# Patient Record
Sex: FEMALE | Race: WHITE | Employment: FULL TIME | URBAN - METROPOLITAN AREA
[De-identification: names, ages, dates, MRNs, and addresses within clinical notes are randomized per-mention and may not be internally consistent; named-entity substitution may affect disease eponyms.]

---

## 2017-01-16 ENCOUNTER — TELEPHONE (OUTPATIENT)
Dept: DERMATOLOGY CLINIC | Facility: CLINIC | Age: 52
End: 2017-01-16

## 2017-01-16 NOTE — TELEPHONE ENCOUNTER
LOV 11/11/16. Per your note, patient to see plastic surgeon. What doctor should I give to patient? Please advise.  Thank you

## 2017-01-18 NOTE — TELEPHONE ENCOUNTER
Pt chooses to go with Dr. Elvis Echeverria - since there's no pathology to send, should I send any note?  Kindly advise

## 2017-04-17 ENCOUNTER — TELEPHONE (OUTPATIENT)
Dept: OBGYN CLINIC | Facility: CLINIC | Age: 52
End: 2017-04-17

## 2017-04-17 NOTE — TELEPHONE ENCOUNTER
Lm requested rx sent to local pharmacy versus mail order. Pt was seen for annual with [facility] on 3/7/16. Pt's last pap/HPV negative on 1/27/14. Per [facility]'s visit notes next cotest 1/19.  Pt's last mammo on 3/14/16 was normal.  Pt has next annual schedule on 5/3/

## 2017-05-03 ENCOUNTER — OFFICE VISIT (OUTPATIENT)
Dept: OBGYN CLINIC | Facility: CLINIC | Age: 52
End: 2017-05-03

## 2017-05-03 VITALS
SYSTOLIC BLOOD PRESSURE: 116 MMHG | DIASTOLIC BLOOD PRESSURE: 74 MMHG | WEIGHT: 198 LBS | HEIGHT: 72 IN | BODY MASS INDEX: 26.82 KG/M2 | HEART RATE: 67 BPM

## 2017-05-03 DIAGNOSIS — Z12.31 VISIT FOR SCREENING MAMMOGRAM: ICD-10-CM

## 2017-05-03 DIAGNOSIS — Z30.41 ORAL CONTRACEPTIVE PILL SURVEILLANCE: ICD-10-CM

## 2017-05-03 DIAGNOSIS — Z01.419 ENCOUNTER FOR GYNECOLOGICAL EXAMINATION WITHOUT ABNORMAL FINDING: Primary | ICD-10-CM

## 2017-05-03 PROCEDURE — 99396 PREV VISIT EST AGE 40-64: CPT | Performed by: OBSTETRICS & GYNECOLOGY

## 2017-05-03 NOTE — PROGRESS NOTES
Jeremy Reyes is a 46year old female  Patient's last menstrual period was 2017.  Patient presents with:  Gyn Exam: ANNUAL EXAM / BCP REFILL -- had questions re: brother-in-law dx w/ throat / tongue cancer caused by HPV; no hot flashes during p coronary artery disease   • Melanoma Father    • Neurological Disorder Sister      multiple sclerosis   • Thyroid disease Brother    • Cancer Maternal Grandfather      pancreatic   • Diabetes Maternal Uncle    • Heart Disease Paternal Grandmother      CAD normal without palpable masses, tenderness, asymmetry, nipple discharge, nipple retraction or skin changes  (+) breast implants  Abdomen:  soft, nontender, nondistended, no masses  (+) tummy tuck scar  Skin/Hair: no unusual rashes or bruises  Extremities:

## 2017-06-02 ENCOUNTER — HOSPITAL ENCOUNTER (OUTPATIENT)
Dept: MAMMOGRAPHY | Age: 52
Discharge: HOME | End: 2017-06-02
Attending: OBSTETRICS & GYNECOLOGY
Payer: COMMERCIAL

## 2017-06-02 DIAGNOSIS — Z12.31 VISIT FOR SCREENING MAMMOGRAM: ICD-10-CM

## 2017-06-02 PROCEDURE — 77067 SCR MAMMO BI INCL CAD: CPT | Performed by: OBSTETRICS & GYNECOLOGY

## 2017-07-05 ENCOUNTER — OFFICE VISIT (OUTPATIENT)
Dept: DERMATOLOGY CLINIC | Facility: CLINIC | Age: 52
End: 2017-07-05

## 2017-07-05 ENCOUNTER — TELEPHONE (OUTPATIENT)
Dept: DERMATOLOGY CLINIC | Facility: CLINIC | Age: 52
End: 2017-07-05

## 2017-07-05 DIAGNOSIS — D23.9 BENIGN NEOPLASM OF SKIN, UNSPECIFIED LOCATION: ICD-10-CM

## 2017-07-05 DIAGNOSIS — D22.9 MULTIPLE NEVI: ICD-10-CM

## 2017-07-05 DIAGNOSIS — B07.8 OTHER VIRAL WARTS: Primary | ICD-10-CM

## 2017-07-05 DIAGNOSIS — L81.4 LENTIGO: ICD-10-CM

## 2017-07-05 PROCEDURE — 99212 OFFICE O/P EST SF 10 MIN: CPT | Performed by: DERMATOLOGY

## 2017-07-05 PROCEDURE — 99213 OFFICE O/P EST LOW 20 MIN: CPT | Performed by: DERMATOLOGY

## 2017-07-05 PROCEDURE — 17110 DESTRUCTION B9 LES UP TO 14: CPT | Performed by: DERMATOLOGY

## 2017-07-05 RX ORDER — IMIQUIMOD 12.5 MG/.25G
CREAM TOPICAL
Qty: 24 PACKET | Refills: 6 | Status: SHIPPED | OUTPATIENT
Start: 2017-07-05 | End: 2018-05-10

## 2017-07-19 ENCOUNTER — TELEPHONE (OUTPATIENT)
Dept: DERMATOLOGY CLINIC | Facility: CLINIC | Age: 52
End: 2017-07-19

## 2017-07-21 NOTE — TELEPHONE ENCOUNTER
PA response from [employer] states that Imiquimod PA was not approved, states med will be approved for up to 12 packets per month for up to 4 months.  Please advise

## 2017-07-22 NOTE — TELEPHONE ENCOUNTER
[facility] pt picked up earlier in July 12 packets of imiquimod and was covered.

## 2017-07-23 NOTE — PROGRESS NOTES
Petra Murry is a 46year old female. HPI:     CC:  Patient presents with:  Warts: Pt c/o warts, one at L thumb prev tx with cryo. Pt thinks she has others at hands. \"I can't see them b/c I forgot my reading classes. \"          Allergies:  Erythromyci Unknown  Sulfa Antibiotics       Unknown    Past Medical History:   Diagnosis Date   • Bulimia 1982    effexor     Past Surgical History:  7/2003: ABDOMINOPLASTY  12/2003: BREAST SURGERY      Comment: Revision of breast implants  7/2003: BREAST of concern at this time. Patient presents with concerns above. Patient has been in their usual state of health. History, medications, allergies reviewed as noted. ROS:  Denies any other systemic complaints.   No new or changeing lesions other than grid.  Viral pathophysiology, postoperative care, possibility of recurrence, spread reviewed.   Extensive periungual lesions at [facility] consider topical retinoid even over-the-counter, repeat cryo 3-4 weeks    Family history skin cancer no new suspicious les

## 2018-02-02 ENCOUNTER — TELEPHONE (OUTPATIENT)
Dept: OBGYN CLINIC | Facility: CLINIC | Age: 53
End: 2018-02-02

## 2018-02-02 NOTE — TELEPHONE ENCOUNTER
Pt states she has been seen in urgent care 3x in the past month for UTI's and requesting to be seen. Informed pt nurse will forward a message to Masha Ricci to review and advise is pt needs to be seen.  Pt states she is also having menopausal symptoms and would lik

## 2018-05-10 ENCOUNTER — OFFICE VISIT (OUTPATIENT)
Dept: OBGYN CLINIC | Facility: CLINIC | Age: 53
End: 2018-05-10

## 2018-05-10 VITALS
HEIGHT: 72 IN | SYSTOLIC BLOOD PRESSURE: 109 MMHG | WEIGHT: 193 LBS | HEART RATE: 64 BPM | DIASTOLIC BLOOD PRESSURE: 73 MMHG | BODY MASS INDEX: 26.14 KG/M2

## 2018-05-10 DIAGNOSIS — Z01.419 ENCOUNTER FOR GYNECOLOGICAL EXAMINATION WITHOUT ABNORMAL FINDING: Primary | ICD-10-CM

## 2018-05-10 DIAGNOSIS — Z12.31 VISIT FOR SCREENING MAMMOGRAM: ICD-10-CM

## 2018-05-10 DIAGNOSIS — Z30.41 ORAL CONTRACEPTIVE PILL SURVEILLANCE: ICD-10-CM

## 2018-05-10 PROCEDURE — 99396 PREV VISIT EST AGE 40-64: CPT | Performed by: OBSTETRICS & GYNECOLOGY

## 2018-05-10 NOTE — PROGRESS NOTES
Jeremy Reyes is a 46year old female  Patient's last menstrual period was 2018. Patient presents with:  Gyn Exam: ANNUAL EXAM/ BCP REFILL -- no issues. No hot flashes during placebo week.   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

## 2018-07-01 ENCOUNTER — HOSPITAL ENCOUNTER (OUTPATIENT)
Dept: MAMMOGRAPHY | Facility: HOSPITAL | Age: 53
Discharge: HOME | End: 2018-07-01
Attending: OBSTETRICS & GYNECOLOGY
Payer: COMMERCIAL

## 2018-07-01 DIAGNOSIS — Z12.31 VISIT FOR SCREENING MAMMOGRAM: ICD-10-CM

## 2018-07-01 PROCEDURE — 77067 SCR MAMMO BI INCL CAD: CPT | Performed by: OBSTETRICS & GYNECOLOGY

## 2018-07-01 PROCEDURE — 77063 BREAST TOMOSYNTHESIS BI: CPT | Performed by: OBSTETRICS & GYNECOLOGY

## 2018-09-26 ENCOUNTER — TELEPHONE (OUTPATIENT)
Dept: OBGYN CLINIC | Facility: CLINIC | Age: 53
End: 2018-09-26

## 2018-09-26 NOTE — TELEPHONE ENCOUNTER
Pharmacy states pt requested we switch her OCP rx to mail order. I approved the switch and LM for the pt to call us back to be sure she wanted her rx changed to mail order.

## 2018-09-26 NOTE — TELEPHONE ENCOUNTER
Per pharmacy pt is requesting a refill on Norethindrone-Eth Estradiol (Gopal Johnson 1/35, 28,) 1-35 MG-MCG Oral Tab.  Please advise

## 2019-02-27 ENCOUNTER — TELEPHONE (OUTPATIENT)
Dept: OBGYN CLINIC | Facility: CLINIC | Age: 54
End: 2019-02-27

## 2019-02-27 NOTE — TELEPHONE ENCOUNTER
Pt's last annual was 5/10/18, last pap in 2014 was normal, last mammo was normal in 2018. Pt was given 3 months with 3 refills at appt in 5/2018. Pt will need a few more refills to cover until appt due in May.   3 months sent to mail order to cover until

## 2019-05-31 ENCOUNTER — OFFICE VISIT (OUTPATIENT)
Dept: OBGYN CLINIC | Facility: CLINIC | Age: 54
End: 2019-05-31
Payer: COMMERCIAL

## 2019-05-31 VITALS
BODY MASS INDEX: 26 KG/M2 | WEIGHT: 209 LBS | HEART RATE: 61 BPM | SYSTOLIC BLOOD PRESSURE: 126 MMHG | DIASTOLIC BLOOD PRESSURE: 81 MMHG

## 2019-05-31 DIAGNOSIS — Z12.31 VISIT FOR SCREENING MAMMOGRAM: ICD-10-CM

## 2019-05-31 DIAGNOSIS — Z01.419 ENCOUNTER FOR GYNECOLOGICAL EXAMINATION WITHOUT ABNORMAL FINDING: Primary | ICD-10-CM

## 2019-05-31 DIAGNOSIS — Z30.41 ORAL CONTRACEPTIVE PILL SURVEILLANCE: ICD-10-CM

## 2019-05-31 DIAGNOSIS — Z11.3 SCREEN FOR STD (SEXUALLY TRANSMITTED DISEASE): ICD-10-CM

## 2019-05-31 DIAGNOSIS — Z12.4 SCREENING FOR MALIGNANT NEOPLASM OF THE CERVIX: ICD-10-CM

## 2019-05-31 PROCEDURE — 99396 PREV VISIT EST AGE 40-64: CPT | Performed by: OBSTETRICS & GYNECOLOGY

## 2019-06-02 NOTE — PROGRESS NOTES
Richard Vázquez is a 48year old female  Patient's last menstrual period was 05/15/2019. Patient presents with:  Gyn Exam: ANNUAL -- going to [employer] soon  Refill Request: OCP -- turning 54 in few months. Concerned re: fertility chances  . Lifestyle      Physical activity:        Days per week: Not on file        Minutes per session: Not on file      Stress: Not on file    Relationships      Social connections:        Talks on phone: Not on file        Gets together: Not on file        Atten Paternal Uncle         CAD   • Stroke Other         CVA, great aunt   • Lipids Mother         hyperlipidemia   • Gastro-Intestinal Disorder Mother         colon polyps   • Basal Cell Mother        MEDICATIONS:    Current Outpatient Medications:   •  Connie Hernandez bruises  Extremities:  no edema, no cyanosis  Psychiatric:   oriented to time, place, person and situation.  Appropriate mood and affect    Pelvic Exam:  External Genitalia:  normal appearance, hair distribution, and no lesions  Urethral Meatus:   normal in Estradiol (NECON 1/35, 28,) 1-35 MG-MCG Oral Tab; Take 1 tablet by mouth daily. Pap w/ HPV & GC. Chl.Trich done. Declines blood STD screen. Mammogram w/ implants ordered. Wishes to continue ocps x 3 months & then will stop & use condoms.  If no sponta

## 2019-07-02 ENCOUNTER — HOSPITAL ENCOUNTER (OUTPATIENT)
Dept: MAMMOGRAPHY | Age: 54
Discharge: HOME | End: 2019-07-02
Attending: OBSTETRICS & GYNECOLOGY
Payer: COMMERCIAL

## 2019-07-02 DIAGNOSIS — Z12.31 VISIT FOR SCREENING MAMMOGRAM: ICD-10-CM

## 2019-07-02 PROCEDURE — 77063 BREAST TOMOSYNTHESIS BI: CPT | Performed by: OBSTETRICS & GYNECOLOGY

## 2019-07-02 PROCEDURE — 77067 SCR MAMMO BI INCL CAD: CPT | Performed by: OBSTETRICS & GYNECOLOGY

## 2020-08-04 ENCOUNTER — OFFICE VISIT (OUTPATIENT)
Dept: OBGYN CLINIC | Facility: CLINIC | Age: 55
End: 2020-08-04
Payer: COMMERCIAL

## 2020-08-04 VITALS
DIASTOLIC BLOOD PRESSURE: 69 MMHG | HEART RATE: 55 BPM | WEIGHT: 184 LBS | BODY MASS INDEX: 23 KG/M2 | SYSTOLIC BLOOD PRESSURE: 108 MMHG

## 2020-08-04 DIAGNOSIS — Z12.31 VISIT FOR SCREENING MAMMOGRAM: ICD-10-CM

## 2020-08-04 DIAGNOSIS — Z01.419 ENCOUNTER FOR GYNECOLOGICAL EXAMINATION WITHOUT ABNORMAL FINDING: Primary | ICD-10-CM

## 2020-08-04 PROCEDURE — 3078F DIAST BP <80 MM HG: CPT | Performed by: OBSTETRICS & GYNECOLOGY

## 2020-08-04 PROCEDURE — 3074F SYST BP LT 130 MM HG: CPT | Performed by: OBSTETRICS & GYNECOLOGY

## 2020-08-04 PROCEDURE — 99396 PREV VISIT EST AGE 40-64: CPT | Performed by: OBSTETRICS & GYNECOLOGY

## 2020-08-04 RX ORDER — MULTIVITAMIN
1 CAPSULE ORAL DAILY
COMMUNITY

## 2020-08-04 RX ORDER — METHYLPREDNISOLONE 4 MG/1
TABLET ORAL
COMMUNITY
End: 2020-09-17

## 2020-08-04 RX ORDER — MEFLOQUINE HYDROCHLORIDE 250 MG/1
TABLET ORAL
COMMUNITY
End: 2020-09-17

## 2020-08-04 RX ORDER — CLINDAMYCIN HYDROCHLORIDE 150 MG/1
CAPSULE ORAL
COMMUNITY
End: 2020-09-17

## 2020-08-04 RX ORDER — CIPROFLOXACIN 250 MG/1
TABLET, FILM COATED ORAL
COMMUNITY
End: 2020-09-17

## 2020-08-04 RX ORDER — TRAMADOL HYDROCHLORIDE 50 MG/1
TABLET ORAL
COMMUNITY
End: 2020-09-17

## 2020-08-04 RX ORDER — DIAZEPAM 5 MG/1
TABLET ORAL
COMMUNITY
End: 2020-09-17

## 2020-08-04 RX ORDER — DOXYCYCLINE HYCLATE 100 MG
TABLET ORAL
COMMUNITY
End: 2020-09-17

## 2020-08-04 RX ORDER — MELOXICAM 15 MG/1
15 TABLET ORAL DAILY
COMMUNITY
Start: 2020-07-23 | End: 2020-09-17

## 2020-08-04 RX ORDER — FLUTICASONE PROPIONATE 50 MCG
SPRAY, SUSPENSION (ML) NASAL
COMMUNITY
End: 2020-09-17

## 2020-08-04 RX ORDER — FLUOXETINE HYDROCHLORIDE 20 MG/1
CAPSULE ORAL
COMMUNITY
End: 2021-02-01

## 2020-08-04 RX ORDER — FLUOXETINE HYDROCHLORIDE 60 MG/1
1 TABLET, FILM COATED ORAL; ORAL DAILY
COMMUNITY
Start: 2020-06-22

## 2020-08-04 RX ORDER — ALPRAZOLAM 0.25 MG/1
TABLET ORAL
COMMUNITY
End: 2020-09-17

## 2020-08-04 RX ORDER — HYDROCODONE BITARTRATE AND ACETAMINOPHEN 7.5; 325 MG/1; MG/1
TABLET ORAL
COMMUNITY
End: 2020-09-17

## 2020-08-04 RX ORDER — MISOPROSTOL 200 UG/1
TABLET ORAL
Qty: 6 TABLET | Refills: 0 | Status: SHIPPED | OUTPATIENT
Start: 2020-08-04 | End: 2020-09-17

## 2020-08-04 RX ORDER — ACETAMINOPHEN AND CODEINE PHOSPHATE 300; 30 MG/1; MG/1
TABLET ORAL
COMMUNITY
End: 2020-09-17

## 2020-08-04 RX ORDER — GABAPENTIN 300 MG/1
300 CAPSULE ORAL NIGHTLY
COMMUNITY
Start: 2020-07-27 | End: 2021-02-01

## 2020-08-04 RX ORDER — VALACYCLOVIR HYDROCHLORIDE 500 MG/1
TABLET, FILM COATED ORAL
COMMUNITY
End: 2020-09-17

## 2020-08-04 NOTE — PROGRESS NOTES
Petra Murry is a 54year old female  Patient's last menstrual period was 2020. Patient presents with:  Gyn Exam: annual -- stopped ocps x one year. Periods monthly . Dx w/ peripheral neuropathy by [facility] last year.   .All relatives w/ hysterect use: No      Sexual activity: Yes        Birth control/protection: OCP    Lifestyle      Physical activity:        Days per week: Not on file        Minutes per session: Not on file      Stress: Not on file    Relationships      Social connections: • Heart Disease Paternal Grandmother         CAD   • Heart Disease Paternal Uncle         CAD   • Stroke Other         CVA, great aunt   • Lipids Mother         hyperlipidemia   • Gastro-Intestinal Disorder Mother         colon polyps   • Basal Cell Moth (EFFEXOR XR OR), Effexor XR, Disp: , Rfl:   •  Docusate Sodium (DOC-Q-LACE OR), Doc-Q-Lace 100 mg capsule, Disp: , Rfl:   •  FLUoxetine HCl 20 MG Oral Cap, fluoxetine 20 mg capsule  TK 3 CS PO ONCE A DAY IN THE MORNING, Disp: , Rfl:   •  Norethindrone-Eth bruises  Extremities:  no edema, no cyanosis  Psychiatric:   oriented to time, place, person and situation.  Appropriate mood and affect    Pelvic Exam:  External Genitalia:  normal appearance, hair distribution, and no lesions  Urethral Meatus:   normal in

## 2020-09-09 ENCOUNTER — HOSPITAL ENCOUNTER (OUTPATIENT)
Dept: MAMMOGRAPHY | Age: 55
Discharge: HOME | End: 2020-09-09
Attending: OBSTETRICS & GYNECOLOGY
Payer: COMMERCIAL

## 2020-09-09 DIAGNOSIS — Z12.31 VISIT FOR SCREENING MAMMOGRAM: ICD-10-CM

## 2020-09-09 PROCEDURE — 77063 BREAST TOMOSYNTHESIS BI: CPT | Performed by: OBSTETRICS & GYNECOLOGY

## 2020-09-09 PROCEDURE — 77067 SCR MAMMO BI INCL CAD: CPT | Performed by: OBSTETRICS & GYNECOLOGY

## 2020-09-14 ENCOUNTER — TELEPHONE (OUTPATIENT)
Dept: OBGYN CLINIC | Facility: CLINIC | Age: 55
End: 2020-09-14

## 2020-09-14 NOTE — TELEPHONE ENCOUNTER
Spoke with pt to see if she is available later this week. 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  No available appts open after 2:30pm this week. Pt will keep appt for next Wednesday.

## 2020-09-14 NOTE — TELEPHONE ENCOUNTER
Per [facility], pt should be rescheduled for the end of this week. Also block appt time once we cancel appt for today.   LMTCB

## 2020-09-14 NOTE — TELEPHONE ENCOUNTER
Pt states it is more than spotting, like a light flow. Pt states she is on day 6 and her periods usually last 7 days. I asked if pt needs a pad or if she can just use a pantyliner. She states she is using a light tampon.   Pt informed we will have to francia

## 2020-09-17 ENCOUNTER — OFFICE VISIT (OUTPATIENT)
Dept: OBGYN CLINIC | Facility: CLINIC | Age: 55
End: 2020-09-17
Payer: COMMERCIAL

## 2020-09-17 VITALS
DIASTOLIC BLOOD PRESSURE: 62 MMHG | HEART RATE: 50 BPM | SYSTOLIC BLOOD PRESSURE: 97 MMHG | BODY MASS INDEX: 22 KG/M2 | WEIGHT: 179.19 LBS

## 2020-09-17 DIAGNOSIS — N93.9 ABNORMAL UTERINE BLEEDING (AUB): Primary | ICD-10-CM

## 2020-09-17 PROCEDURE — 3078F DIAST BP <80 MM HG: CPT | Performed by: OBSTETRICS & GYNECOLOGY

## 2020-09-17 PROCEDURE — 3074F SYST BP LT 130 MM HG: CPT | Performed by: OBSTETRICS & GYNECOLOGY

## 2020-09-17 PROCEDURE — 58100 BIOPSY OF UTERUS LINING: CPT | Performed by: OBSTETRICS & GYNECOLOGY

## 2020-09-18 NOTE — PROCEDURES
Endometrial Biopsy     Pre-Procedure Care:   Consent was obtained. Procedure/risks were explained. Questions were answered. Correct patient was identified. Correct side and site were confirmed.     Birth control method(s) used: vasectomy    Indication:

## 2021-02-01 ENCOUNTER — OFFICE VISIT (OUTPATIENT)
Dept: DERMATOLOGY CLINIC | Facility: CLINIC | Age: 56
End: 2021-02-01
Payer: COMMERCIAL

## 2021-02-01 DIAGNOSIS — L30.9 DERMATITIS: Primary | ICD-10-CM

## 2021-02-01 DIAGNOSIS — D22.9 MULTIPLE NEVI: ICD-10-CM

## 2021-02-01 DIAGNOSIS — D23.5 BENIGN NEOPLASM OF SKIN OF TRUNK, EXCEPT SCROTUM: ICD-10-CM

## 2021-02-01 DIAGNOSIS — D23.30 BENIGN NEOPLASM OF SKIN OF FACE: ICD-10-CM

## 2021-02-01 DIAGNOSIS — D23.4 BENIGN NEOPLASM OF SCALP AND SKIN OF NECK: ICD-10-CM

## 2021-02-01 DIAGNOSIS — L72.0 EPIDERMAL CYST: ICD-10-CM

## 2021-02-01 DIAGNOSIS — D23.60 BENIGN NEOPLASM OF SKIN OF UPPER LIMB, INCLUDING SHOULDER, UNSPECIFIED LATERALITY: ICD-10-CM

## 2021-02-01 PROCEDURE — 99203 OFFICE O/P NEW LOW 30 MIN: CPT | Performed by: DERMATOLOGY

## 2021-02-01 RX ORDER — CLINDAMYCIN HYDROCHLORIDE 150 MG/1
CAPSULE ORAL
COMMUNITY
Start: 2021-01-23 | End: 2021-09-17

## 2021-02-01 RX ORDER — BETAMETHASONE DIPROPIONATE 0.5 MG/G
CREAM TOPICAL
Qty: 30 G | Refills: 3 | Status: SHIPPED | OUTPATIENT
Start: 2021-02-01 | End: 2021-09-17

## 2021-02-02 ENCOUNTER — IMMUNIZATION (OUTPATIENT)
Dept: LAB | Facility: HOSPITAL | Age: 56
End: 2021-02-02
Attending: EMERGENCY MEDICINE
Payer: COMMERCIAL

## 2021-02-02 DIAGNOSIS — Z23 NEED FOR VACCINATION: Primary | ICD-10-CM

## 2021-02-02 PROCEDURE — 0011A SARSCOV2 VAC 100MCG/0.5ML IM: CPT

## 2021-02-08 NOTE — PROGRESS NOTES
Mary Rodriguez is a 54year old female. HPI:     CC:  Patient presents with:  Upper Body Exam: LOV in 2017. Pt requesting upper body exam. Concerned with with pruritic lesion on right index finger. Mother and father have hx of skin ca.  Denies personal hx o External Cream Apply to affected areas as needed bid 30 g 3   • Multiple Vitamin (MULTIVITAMINS) Oral Cap Take 1 capsule by mouth daily. • FLUoxetine HCl 60 MG Oral Tab Take 1 tablet by mouth daily.      • RESTASIS 0.05 % Ophthalmic Emulsion   3     All Emotionally abused: Not on file        Physically abused: Not on file        Forced sexual activity: Not on file    Other Topics      Concerns:         Service: Not Asked        Blood Transfusions: Not Asked        Caffeine Concern:  Yes reviewed. Updated and new information noted in current visit. Patient with family history of skin cancer. Concern with multiple skin lesions in particular itchy eruption right index finger. Has been going on for a while. Prior biopsies benign.   Pa of upper limb, including shoulder, unspecified laterality  Epidermal cyst    See details on map. Remarkable for:    Eczematous patches over right index finger. More dyshidrotic  Betamethasone  Dermatitis. Meds in grid.   Skin care instructions review

## 2021-03-02 ENCOUNTER — IMMUNIZATION (OUTPATIENT)
Dept: LAB | Facility: HOSPITAL | Age: 56
End: 2021-03-02
Attending: EMERGENCY MEDICINE
Payer: COMMERCIAL

## 2021-03-02 DIAGNOSIS — Z23 NEED FOR VACCINATION: Primary | ICD-10-CM

## 2021-03-02 PROCEDURE — 0012A SARSCOV2 VAC 100MCG/0.5ML IM: CPT

## 2021-09-17 ENCOUNTER — OFFICE VISIT (OUTPATIENT)
Dept: DERMATOLOGY CLINIC | Facility: CLINIC | Age: 56
End: 2021-09-17
Payer: COMMERCIAL

## 2021-09-17 DIAGNOSIS — L40.9 PSORIASIS: Primary | ICD-10-CM

## 2021-09-17 PROCEDURE — 99203 OFFICE O/P NEW LOW 30 MIN: CPT | Performed by: PHYSICIAN ASSISTANT

## 2021-09-17 RX ORDER — BETAMETHASONE DIPROPIONATE 0.5 MG/G
CREAM TOPICAL
Qty: 30 G | Refills: 3 | Status: SHIPPED | OUTPATIENT
Start: 2021-09-17

## 2021-09-17 NOTE — PROGRESS NOTES
HPI:    Patient ID: Tircia Mayberry is a 64year old female. Patient presents with dry and itchy scalp. She uses bethamethasone foam for the psoriasis that she has. Has been more itchy and flaking. No draining noted. No tenderness noted.  No allergies to m This Visit:  Requested Prescriptions     Signed Prescriptions Disp Refills   • Betamethasone Dipropionate Aug 0.05 % External Cream 30 g 3     Sig: Apply to affected areas as needed bid       Imaging & Referrals:  None         #[id number]

## 2022-08-03 ENCOUNTER — TELEPHONE (OUTPATIENT)
Dept: OBGYN CLINIC | Facility: CLINIC | Age: 57
End: 2022-08-03

## 2022-08-03 NOTE — TELEPHONE ENCOUNTER
[facility] needs to cancel her office hours at 4 & 420pm on 10/6. Pt is scheduled for appt on 10/6 at [facility] to see if pt can come in at 1pm on 10/6 instead?

## 2022-10-08 ENCOUNTER — HOSPITAL ENCOUNTER (OUTPATIENT)
Dept: MAMMOGRAPHY | Age: 57
Discharge: HOME | End: 2022-10-08
Attending: FAMILY MEDICINE
Payer: COMMERCIAL

## 2022-10-08 ENCOUNTER — HOSPITAL ENCOUNTER (OUTPATIENT)
Dept: BONE DENSITY | Age: 57
Discharge: HOME | End: 2022-10-08
Attending: FAMILY MEDICINE
Payer: COMMERCIAL

## 2022-10-08 DIAGNOSIS — Z78.0 POST-MENOPAUSAL: ICD-10-CM

## 2022-10-08 DIAGNOSIS — Z12.31 ENCOUNTER FOR SCREENING MAMMOGRAM FOR MALIGNANT NEOPLASM OF BREAST: ICD-10-CM

## 2022-10-08 PROCEDURE — 77067 SCR MAMMO BI INCL CAD: CPT | Performed by: FAMILY MEDICINE

## 2022-10-08 PROCEDURE — 77080 DXA BONE DENSITY AXIAL: CPT | Performed by: FAMILY MEDICINE

## 2022-10-08 PROCEDURE — 77063 BREAST TOMOSYNTHESIS BI: CPT | Performed by: FAMILY MEDICINE

## 2022-10-13 ENCOUNTER — OFFICE VISIT (OUTPATIENT)
Dept: OBGYN CLINIC | Facility: CLINIC | Age: 57
End: 2022-10-13
Payer: COMMERCIAL

## 2022-10-13 VITALS
SYSTOLIC BLOOD PRESSURE: 135 MMHG | DIASTOLIC BLOOD PRESSURE: 85 MMHG | HEART RATE: 47 BPM | WEIGHT: 186.81 LBS | BODY MASS INDEX: 23 KG/M2

## 2022-10-13 DIAGNOSIS — Z12.4 SCREENING FOR MALIGNANT NEOPLASM OF CERVIX: ICD-10-CM

## 2022-10-13 DIAGNOSIS — Z01.419 ENCOUNTER FOR GYNECOLOGICAL EXAMINATION WITHOUT ABNORMAL FINDING: Primary | ICD-10-CM

## 2022-10-13 DIAGNOSIS — Z11.3 SCREEN FOR STD (SEXUALLY TRANSMITTED DISEASE): ICD-10-CM

## 2022-10-13 PROCEDURE — 99396 PREV VISIT EST AGE 40-64: CPT | Performed by: OBSTETRICS & GYNECOLOGY

## 2022-10-13 PROCEDURE — 3075F SYST BP GE 130 - 139MM HG: CPT | Performed by: OBSTETRICS & GYNECOLOGY

## 2022-10-13 PROCEDURE — 3079F DIAST BP 80-89 MM HG: CPT | Performed by: OBSTETRICS & GYNECOLOGY

## 2022-10-13 RX ORDER — MELOXICAM 10 MG/1
CAPSULE ORAL
COMMUNITY
Start: 2021-04-01

## 2022-10-13 RX ORDER — ALPRAZOLAM 0.25 MG/1
0.25 TABLET ORAL 2 TIMES DAILY PRN
COMMUNITY
Start: 2022-05-28

## 2022-10-14 LAB
C TRACH DNA SPEC QL NAA+PROBE: NEGATIVE
HPV I/H RISK 1 DNA SPEC QL NAA+PROBE: NEGATIVE
N GONORRHOEA DNA SPEC QL NAA+PROBE: NEGATIVE
T VAGINALIS RRNA SPEC QL NAA+PROBE: NEGATIVE

## 2023-01-16 ENCOUNTER — MED REC SCAN ONLY (OUTPATIENT)
Dept: PHYSICAL MEDICINE AND REHAB | Facility: CLINIC | Age: 58
End: 2023-01-16

## 2023-01-16 ENCOUNTER — OFFICE VISIT (OUTPATIENT)
Dept: PHYSICAL MEDICINE AND REHAB | Facility: CLINIC | Age: 58
End: 2023-01-16
Payer: COMMERCIAL

## 2023-01-16 VITALS — OXYGEN SATURATION: 97 % | HEART RATE: 60 BPM | DIASTOLIC BLOOD PRESSURE: 60 MMHG | SYSTOLIC BLOOD PRESSURE: 100 MMHG

## 2023-01-16 DIAGNOSIS — K21.9 GASTROESOPHAGEAL REFLUX DISEASE WITHOUT ESOPHAGITIS: ICD-10-CM

## 2023-01-16 DIAGNOSIS — M54.2 CERVICALGIA: Primary | ICD-10-CM

## 2023-01-16 PROCEDURE — 3078F DIAST BP <80 MM HG: CPT | Performed by: PHYSICAL MEDICINE & REHABILITATION

## 2023-01-16 PROCEDURE — 99204 OFFICE O/P NEW MOD 45 MIN: CPT | Performed by: PHYSICAL MEDICINE & REHABILITATION

## 2023-01-16 PROCEDURE — 3074F SYST BP LT 130 MM HG: CPT | Performed by: PHYSICAL MEDICINE & REHABILITATION

## 2023-01-16 RX ORDER — OMEPRAZOLE 20 MG/1
40 CAPSULE, DELAYED RELEASE ORAL
COMMUNITY

## 2023-01-19 ENCOUNTER — APPOINTMENT (OUTPATIENT)
Dept: PHYSICAL THERAPY | Age: 58
End: 2023-01-19
Attending: PHYSICAL MEDICINE & REHABILITATION
Payer: COMMERCIAL

## 2023-01-26 ENCOUNTER — APPOINTMENT (OUTPATIENT)
Dept: PHYSICAL THERAPY | Age: 58
End: 2023-01-26
Attending: PHYSICAL MEDICINE & REHABILITATION
Payer: COMMERCIAL

## 2023-02-01 ENCOUNTER — APPOINTMENT (OUTPATIENT)
Dept: PHYSICAL THERAPY | Age: 58
End: 2023-02-01
Attending: PHYSICAL MEDICINE & REHABILITATION
Payer: COMMERCIAL

## 2023-02-02 ENCOUNTER — APPOINTMENT (OUTPATIENT)
Dept: PHYSICAL THERAPY | Age: 58
End: 2023-02-02
Attending: PHYSICAL MEDICINE & REHABILITATION
Payer: COMMERCIAL

## 2023-02-08 ENCOUNTER — APPOINTMENT (OUTPATIENT)
Dept: PHYSICAL THERAPY | Age: 58
End: 2023-02-08
Attending: PHYSICAL MEDICINE & REHABILITATION
Payer: COMMERCIAL

## 2023-02-13 ENCOUNTER — APPOINTMENT (OUTPATIENT)
Dept: PHYSICAL THERAPY | Age: 58
End: 2023-02-13
Attending: PHYSICAL MEDICINE & REHABILITATION
Payer: COMMERCIAL

## 2023-02-15 ENCOUNTER — OFFICE VISIT (OUTPATIENT)
Dept: DERMATOLOGY CLINIC | Facility: CLINIC | Age: 58
End: 2023-02-15

## 2023-02-15 DIAGNOSIS — D23.70 BENIGN NEOPLASM OF SKIN OF LOWER LIMB, INCLUDING HIP, UNSPECIFIED LATERALITY: ICD-10-CM

## 2023-02-15 DIAGNOSIS — D23.30 BENIGN NEOPLASM OF SKIN OF FACE: ICD-10-CM

## 2023-02-15 DIAGNOSIS — L82.1 SK (SEBORRHEIC KERATOSIS): ICD-10-CM

## 2023-02-15 DIAGNOSIS — D23.4 BENIGN NEOPLASM OF SCALP AND SKIN OF NECK: ICD-10-CM

## 2023-02-15 DIAGNOSIS — Z12.83 SKIN CANCER SCREENING: ICD-10-CM

## 2023-02-15 DIAGNOSIS — L30.9 DERMATITIS: ICD-10-CM

## 2023-02-15 DIAGNOSIS — D22.9 MULTIPLE NEVI: Primary | ICD-10-CM

## 2023-02-15 DIAGNOSIS — D23.60 BENIGN NEOPLASM OF SKIN OF UPPER LIMB, INCLUDING SHOULDER, UNSPECIFIED LATERALITY: ICD-10-CM

## 2023-02-15 DIAGNOSIS — D23.5 BENIGN NEOPLASM OF SKIN OF TRUNK: ICD-10-CM

## 2023-02-15 PROCEDURE — 99214 OFFICE O/P EST MOD 30 MIN: CPT | Performed by: DERMATOLOGY

## 2023-02-15 RX ORDER — EFINACONAZOLE 100 MG/ML
SOLUTION TOPICAL
COMMUNITY
Start: 2023-01-28

## 2023-02-16 ENCOUNTER — APPOINTMENT (OUTPATIENT)
Dept: PHYSICAL THERAPY | Age: 58
End: 2023-02-16
Attending: PHYSICAL MEDICINE & REHABILITATION
Payer: COMMERCIAL

## 2023-02-20 ENCOUNTER — APPOINTMENT (OUTPATIENT)
Dept: PHYSICAL THERAPY | Age: 58
End: 2023-02-20
Attending: PHYSICAL MEDICINE & REHABILITATION
Payer: COMMERCIAL

## 2023-02-21 ENCOUNTER — APPOINTMENT (OUTPATIENT)
Dept: PHYSICAL THERAPY | Age: 58
End: 2023-02-21
Attending: PHYSICAL MEDICINE & REHABILITATION
Payer: COMMERCIAL

## 2023-02-23 ENCOUNTER — APPOINTMENT (OUTPATIENT)
Dept: PHYSICAL THERAPY | Age: 58
End: 2023-02-23
Attending: PHYSICAL MEDICINE & REHABILITATION
Payer: COMMERCIAL

## 2023-02-27 ENCOUNTER — APPOINTMENT (OUTPATIENT)
Dept: PHYSICAL THERAPY | Age: 58
End: 2023-02-27
Attending: PHYSICAL MEDICINE & REHABILITATION
Payer: COMMERCIAL

## 2023-02-28 ENCOUNTER — APPOINTMENT (OUTPATIENT)
Dept: PHYSICAL THERAPY | Age: 58
End: 2023-02-28
Attending: PHYSICAL MEDICINE & REHABILITATION
Payer: COMMERCIAL

## 2023-03-02 ENCOUNTER — APPOINTMENT (OUTPATIENT)
Dept: PHYSICAL THERAPY | Age: 58
End: 2023-03-02
Attending: PHYSICAL MEDICINE & REHABILITATION
Payer: COMMERCIAL

## 2023-03-06 ENCOUNTER — APPOINTMENT (OUTPATIENT)
Dept: PHYSICAL THERAPY | Age: 58
End: 2023-03-06
Attending: PHYSICAL MEDICINE & REHABILITATION
Payer: COMMERCIAL

## 2023-03-09 ENCOUNTER — APPOINTMENT (OUTPATIENT)
Dept: PHYSICAL THERAPY | Age: 58
End: 2023-03-09
Attending: PHYSICAL MEDICINE & REHABILITATION
Payer: COMMERCIAL

## 2023-11-06 ENCOUNTER — HOSPITAL ENCOUNTER (OUTPATIENT)
Dept: GENERAL RADIOLOGY | Facility: HOSPITAL | Age: 58
Discharge: HOME | End: 2023-11-06
Attending: INTERNAL MEDICINE
Payer: COMMERCIAL

## 2023-11-06 ENCOUNTER — OFFICE VISIT (OUTPATIENT)
Dept: RHEUMATOLOGY | Facility: CLINIC | Age: 58
End: 2023-11-06
Payer: COMMERCIAL

## 2023-11-06 ENCOUNTER — LAB ENCOUNTER (OUTPATIENT)
Dept: LAB | Facility: HOSPITAL | Age: 58
End: 2023-11-06
Attending: INTERNAL MEDICINE
Payer: COMMERCIAL

## 2023-11-06 VITALS
RESPIRATION RATE: 16 BRPM | HEART RATE: 66 BPM | WEIGHT: 170.81 LBS | BODY MASS INDEX: 23.14 KG/M2 | HEIGHT: 72 IN | SYSTOLIC BLOOD PRESSURE: 98 MMHG | DIASTOLIC BLOOD PRESSURE: 65 MMHG

## 2023-11-06 DIAGNOSIS — M25.50 POLYARTHRALGIA: ICD-10-CM

## 2023-11-06 DIAGNOSIS — M25.50 POLYARTHRALGIA: Primary | ICD-10-CM

## 2023-11-06 DIAGNOSIS — M25.552 BILATERAL HIP PAIN: ICD-10-CM

## 2023-11-06 DIAGNOSIS — M79.10 MYALGIA: ICD-10-CM

## 2023-11-06 DIAGNOSIS — M54.2 NECK PAIN: ICD-10-CM

## 2023-11-06 DIAGNOSIS — M25.551 BILATERAL HIP PAIN: ICD-10-CM

## 2023-11-06 DIAGNOSIS — M54.50 LOW BACK PAIN, UNSPECIFIED BACK PAIN LATERALITY, UNSPECIFIED CHRONICITY, UNSPECIFIED WHETHER SCIATICA PRESENT: ICD-10-CM

## 2023-11-06 LAB
ALBUMIN SERPL-MCNC: 4.6 G/DL (ref 3.2–4.8)
ALBUMIN/GLOB SERPL: 1.6 {RATIO} (ref 1–2)
ALP LIVER SERPL-CCNC: 83 U/L
ALT SERPL-CCNC: 17 U/L
ANION GAP SERPL CALC-SCNC: 8 MMOL/L (ref 0–18)
AST SERPL-CCNC: 32 U/L (ref ?–34)
BILIRUB SERPL-MCNC: 0.5 MG/DL (ref 0.3–1.2)
BUN BLD-MCNC: 7 MG/DL (ref 9–23)
BUN/CREAT SERPL: 8.8 (ref 10–20)
CALCIUM BLD-MCNC: 9.6 MG/DL (ref 8.7–10.4)
CHLORIDE SERPL-SCNC: 101 MMOL/L (ref 98–112)
CK SERPL-CCNC: 103 U/L
CO2 SERPL-SCNC: 30 MMOL/L (ref 21–32)
CREAT BLD-MCNC: 0.8 MG/DL
CRP SERPL-MCNC: <0.4 MG/DL (ref ?–1)
DEPRECATED RDW RBC AUTO: 42.9 FL (ref 35.1–46.3)
EGFRCR SERPLBLD CKD-EPI 2021: 85 ML/MIN/1.73M2 (ref 60–?)
ERYTHROCYTE [DISTWIDTH] IN BLOOD BY AUTOMATED COUNT: 13.5 % (ref 11–15)
ERYTHROCYTE [SEDIMENTATION RATE] IN BLOOD: 11 MM/HR
FASTING STATUS PATIENT QL REPORTED: NO
GLOBULIN PLAS-MCNC: 2.9 G/DL (ref 2.8–4.4)
GLUCOSE BLD-MCNC: 75 MG/DL (ref 70–99)
HCT VFR BLD AUTO: 43.2 %
HGB BLD-MCNC: 14 G/DL
MCH RBC QN AUTO: 28.2 PG (ref 26–34)
MCHC RBC AUTO-ENTMCNC: 32.4 G/DL (ref 31–37)
MCV RBC AUTO: 86.9 FL
OSMOLALITY SERPL CALC.SUM OF ELEC: 285 MOSM/KG (ref 275–295)
PLATELET # BLD AUTO: 234 10(3)UL (ref 150–450)
POTASSIUM SERPL-SCNC: 3.5 MMOL/L (ref 3.5–5.1)
PROT SERPL-MCNC: 7.5 G/DL (ref 5.7–8.2)
RBC # BLD AUTO: 4.97 X10(6)UL
RHEUMATOID FACT SERPL-ACNC: <10 IU/ML (ref ?–14)
SODIUM SERPL-SCNC: 139 MMOL/L (ref 136–145)
WBC # BLD AUTO: 6.9 X10(3) UL (ref 4–11)

## 2023-11-06 PROCEDURE — 85027 COMPLETE CBC AUTOMATED: CPT

## 2023-11-06 PROCEDURE — 99244 OFF/OP CNSLTJ NEW/EST MOD 40: CPT | Performed by: INTERNAL MEDICINE

## 2023-11-06 PROCEDURE — 86038 ANTINUCLEAR ANTIBODIES: CPT

## 2023-11-06 PROCEDURE — 86431 RHEUMATOID FACTOR QUANT: CPT

## 2023-11-06 PROCEDURE — 86200 CCP ANTIBODY: CPT

## 2023-11-06 PROCEDURE — 72040 X-RAY EXAM NECK SPINE 2-3 VW: CPT | Performed by: INTERNAL MEDICINE

## 2023-11-06 PROCEDURE — 86140 C-REACTIVE PROTEIN: CPT

## 2023-11-06 PROCEDURE — 3078F DIAST BP <80 MM HG: CPT | Performed by: INTERNAL MEDICINE

## 2023-11-06 PROCEDURE — 73523 X-RAY EXAM HIPS BI 5/> VIEWS: CPT | Performed by: INTERNAL MEDICINE

## 2023-11-06 PROCEDURE — 36415 COLL VENOUS BLD VENIPUNCTURE: CPT

## 2023-11-06 PROCEDURE — 85652 RBC SED RATE AUTOMATED: CPT

## 2023-11-06 PROCEDURE — 80053 COMPREHEN METABOLIC PANEL: CPT

## 2023-11-06 PROCEDURE — 3074F SYST BP LT 130 MM HG: CPT | Performed by: INTERNAL MEDICINE

## 2023-11-06 PROCEDURE — 81374 HLA I TYPING 1 ANTIGEN LR: CPT

## 2023-11-06 PROCEDURE — 3008F BODY MASS INDEX DOCD: CPT | Performed by: INTERNAL MEDICINE

## 2023-11-06 PROCEDURE — 82550 ASSAY OF CK (CPK): CPT

## 2023-11-06 PROCEDURE — 82085 ASSAY OF ALDOLASE: CPT

## 2023-11-06 RX ORDER — PANTOPRAZOLE SODIUM 40 MG/1
40 TABLET, DELAYED RELEASE ORAL
COMMUNITY

## 2023-11-06 RX ORDER — CYCLOBENZAPRINE HCL 5 MG
TABLET ORAL
Qty: 60 TABLET | Refills: 3 | Status: SHIPPED | OUTPATIENT
Start: 2023-11-06

## 2023-11-06 NOTE — PATIENT INSTRUCTIONS
1 . Check  labs and xrays   2. Physical thearpy for neck and hips   3. Try cyclobenzaprine 5-10mg at night as needed for neck pain   4.  Return to clinic in 2 months

## 2023-11-07 LAB — CCP IGG SERPL-ACNC: 1.6 U/ML (ref 0–6.9)

## 2023-11-08 ENCOUNTER — PATIENT MESSAGE (OUTPATIENT)
Dept: RHEUMATOLOGY | Facility: CLINIC | Age: 58
End: 2023-11-08

## 2023-11-08 LAB — ALDOLASE: 4.4 U/L

## 2023-11-09 LAB — NUCLEAR IGG TITR SER IF: NEGATIVE {TITER}

## 2023-11-09 NOTE — TELEPHONE ENCOUNTER
From: Katelin Brewer  To: Tre Bernabe  Sent: 11/8/2023 1:01 PM CST  Subject: test results    Hi Dr. Lina Oviedo, Thank you so much for your comments and tests that you ordered. I can see that there is arthritis in my neck that's causing the pain but there doesn't seem to be arthritis in my hips? So are you able to tell what would be causing so much stiffness whenever I get up from a seated position if it isn't arthritis in my hips? Thank you!

## 2023-11-10 RX ORDER — CELECOXIB 200 MG/1
200 CAPSULE ORAL DAILY
Qty: 30 CAPSULE | Refills: 3 | Status: SHIPPED | OUTPATIENT
Start: 2023-11-10

## 2023-11-10 RX ORDER — OMEGA-3-ACID ETHYL ESTERS 1 G/1
2 CAPSULE, LIQUID FILLED ORAL 2 TIMES DAILY
Qty: 60 CAPSULE | Refills: 5 | Status: SHIPPED | OUTPATIENT
Start: 2023-11-10

## 2023-11-13 LAB — HLA-B27: NEGATIVE

## 2023-11-20 RX ORDER — CELECOXIB 200 MG/1
200 CAPSULE ORAL DAILY
Qty: 90 CAPSULE | Refills: 1 | Status: SHIPPED | OUTPATIENT
Start: 2023-11-20

## 2023-11-20 RX ORDER — OMEGA-3-ACID ETHYL ESTERS 1 G/1
2 CAPSULE, LIQUID FILLED ORAL 2 TIMES DAILY
Qty: 180 CAPSULE | Refills: 1 | Status: SHIPPED | OUTPATIENT
Start: 2023-11-20

## 2023-11-20 NOTE — TELEPHONE ENCOUNTER
Pt requests meds to be sent to mail order pharmacy please. Requested Prescriptions     Pending Prescriptions Disp Refills    celecoxib 200 MG Oral Cap 30 capsule 3     Sig: Take 1 capsule (200 mg total) by mouth daily. omega-3-acid ethyl esters 1 g Oral Cap 60 capsule 5     Sig: Take 2 capsules (2 g total) by mouth 2 (two) times daily. LF: 11/10/23  LOV: 11/6/23   Future Appointments   Date Time Provider Cheyenne Kowalski   1/11/2024  4:20 PM Karma Hopper MD [facility]   2/12/2024  3:10 PM Torito Lemus MD [facility]     Labs:   Component      Latest Ref Rng 11/6/2023   Glucose      70 - 99 mg/dL 75    Sodium      136 - 145 mmol/L 139    Potassium      3.5 - 5.1 mmol/L 3.5    Chloride      98 - 112 mmol/L 101    Carbon Dioxide, Total      21.0 - 32.0 mmol/L 30.0    ANION GAP      0 - 18 mmol/L 8    BUN      9 - 23 mg/dL 7 (L)    CREATININE      0.55 - 1.02 mg/dL 0.80    BUN/CREATININE RATIO      10.0 - 20.0  8.8 (L)    CALCIUM      8.7 - 10.4 mg/dL 9.6    CALCULATED OSMOLALITY      275 - 295 mOsm/kg 285    EGFR      >=60 mL/min/1.73m2 85    ALT (SGPT)      10 - 49 U/L 17    AST (SGOT)      <=34 U/L 32    ALKALINE PHOSPHATASE      46 - 118 U/L 83    Total Bilirubin      0.3 - 1.2 mg/dL 0.5    PROTEIN, TOTAL      5.7 - 8.2 g/dL 7.5    Albumin      3.2 - 4.8 g/dL 4.6    Globulin      2.8 - 4.4 g/dL 2.9    A/G Ratio      1.0 - 2.0  1.6    Patient Fasting for CMP?  No    WBC      4.0 - 11.0 x10(3) uL 6.9    RBC      3.80 - 5.30 x10(6)uL 4.97    Hemoglobin      12.0 - 16.0 g/dL 14.0    Hematocrit      35.0 - 48.0 % 43.2    MCV      80.0 - 100.0 fL 86.9    MCH      26.0 - 34.0 pg 28.2    MCHC      31.0 - 37.0 g/dL 32.4    RDW      11.0 - 15.0 % 13.5    RDW-SD      35.1 - 46.3 fL 42.9    Platelet Count      784.2 - 450.0 10(3)uL 234.0    RHEUMATOID FACTOR      <14 IU/mL <10    HLA-B27 Negative    C-Citrullinated Peptide IgG AB      0.0 - 6.9 U/mL 1.6    NADEEM SCREEN WITH REFLEX (S) Negative  Negative    SED RATE      0 - 30 mm/Hr 11    C-REACTIVE PROTEIN      <1.00 mg/dL <0.40    Aldolase      3.3 - 10.3 U/L 4.4    CK      34 - 145 U/L 103       Legend:  (L) Low  Summary:  1 . Check  labs and xrays   2. Physical therapy for neck and hips   3. Try cyclobenzaprine 5-10mg at night as needed for neck pain   4.  Return to clinic in 2 months      Mariusz Mccloud MD  11/6/2023  4:27 PM

## 2024-01-11 ENCOUNTER — TELEPHONE (OUTPATIENT)
Dept: OBGYN CLINIC | Facility: CLINIC | Age: 59
End: 2024-01-11

## 2024-02-05 NOTE — TELEPHONE ENCOUNTER
Requested Prescriptions     Pending Prescriptions Disp Refills    omega-3-acid ethyl esters 1 g Oral Cap 360 capsule 0     Sig: Take 2 capsules (2 g total) by mouth 2 (two) times daily.     Future Appointments   Date Time Provider Department Center   2/12/2024  3:10 PM Erik Loyola MD [facility]   3/12/2024  4:20 PM [facility]     LOV: 11/6/23  Last Refilled:11/20/23 #180 1RF  Labs:  Component      Latest Ref Rng 11/6/2023   Glucose      70 - 99 mg/dL 75    Sodium      136 - 145 mmol/L 139    Potassium      3.5 - 5.1 mmol/L 3.5    Chloride      98 - 112 mmol/L 101    Carbon Dioxide, Total      21.0 - 32.0 mmol/L 30.0    ANION GAP      0 - 18 mmol/L 8    BUN      9 - 23 mg/dL 7 (L)    CREATININE      0.55 - 1.02 mg/dL 0.80    BUN/CREATININE RATIO      10.0 - 20.0  8.8 (L)    CALCIUM      8.7 - 10.4 mg/dL 9.6    CALCULATED OSMOLALITY      275 - 295 mOsm/kg 285    EGFR      >=60 mL/min/1.73m2 85    ALT (SGPT)      10 - 49 U/L 17    AST (SGOT)      <=34 U/L 32    ALKALINE PHOSPHATASE      46 - 118 U/L 83    Total Bilirubin      0.3 - 1.2 mg/dL 0.5    PROTEIN, TOTAL      5.7 - 8.2 g/dL 7.5    Albumin      3.2 - 4.8 g/dL 4.6    Globulin      2.8 - 4.4 g/dL 2.9    A/G Ratio      1.0 - 2.0  1.6    Patient Fasting for CMP? No    WBC      4.0 - 11.0 x10(3) uL 6.9    RBC      3.80 - 5.30 x10(6)uL 4.97    Hemoglobin      12.0 - 16.0 g/dL 14.0    Hematocrit      35.0 - 48.0 % 43.2    MCV      80.0 - 100.0 fL 86.9    MCH      26.0 - 34.0 pg 28.2    MCHC      31.0 - 37.0 g/dL 32.4    RDW      11.0 - 15.0 % 13.5    RDW-SD      35.1 - 46.3 fL 42.9    Platelet Count      150.0 - 450.0 10(3)uL 234.0    RHEUMATOID FACTOR      <14 IU/mL <10    HLA-B27 Negative    C-Citrullinated Peptide IgG AB      0.0 - 6.9 U/mL 1.6    NADEEM SCREEN WITH REFLEX (S)      Negative  Negative    SED RATE      0 - 30 mm/Hr 11    C-REACTIVE PROTEIN      <1.00 mg/dL <0.40    Aldolase      3.3 - 10.3 U/L 4.4    CK      34  - 145 U/L 103       Legend:  (L) Low    Summary:  1 .Check  labs and xrays   2. Physical therapy for neck and hips   3. Try cyclobenzaprine 5-10mg at night as needed for neck pain   4. Return to clinic in 2 months      Erik Loyola MD  11/6/2023  4:27 PM

## 2024-02-06 RX ORDER — OMEGA-3-ACID ETHYL ESTERS 1 G/1
2 CAPSULE, LIQUID FILLED ORAL 2 TIMES DAILY
Qty: 360 CAPSULE | Refills: 5 | Status: SHIPPED | OUTPATIENT
Start: 2024-02-06

## 2024-04-11 ENCOUNTER — HOSPITAL ENCOUNTER (OUTPATIENT)
Age: 59
Discharge: HOME | End: 2024-04-11
Payer: COMMERCIAL

## 2024-04-11 VITALS
OXYGEN SATURATION: 100 % | DIASTOLIC BLOOD PRESSURE: 73 MMHG | RESPIRATION RATE: 18 BRPM | SYSTOLIC BLOOD PRESSURE: 127 MMHG | TEMPERATURE: 98 F | HEART RATE: 68 BPM

## 2024-04-11 DIAGNOSIS — N30.00 ACUTE CYSTITIS WITHOUT HEMATURIA: Primary | ICD-10-CM

## 2024-04-11 LAB
BILIRUB UR QL STRIP: NEGATIVE
CLARITY UR: CLEAR
COLOR UR: YELLOW
GLUCOSE UR STRIP-MCNC: NEGATIVE MG/DL
KETONES UR STRIP-MCNC: NEGATIVE MG/DL
NITRITE UR QL STRIP: NEGATIVE
PH UR STRIP: 7 [PH]
PROT UR STRIP-MCNC: NEGATIVE MG/DL
SP GR UR STRIP: 1.01
UROBILINOGEN UR STRIP-ACNC: <2 MG/DL

## 2024-04-11 PROCEDURE — 81002 URINALYSIS NONAUTO W/O SCOPE: CPT | Performed by: NURSE PRACTITIONER

## 2024-04-11 PROCEDURE — 87086 URINE CULTURE/COLONY COUNT: CPT | Performed by: NURSE PRACTITIONER

## 2024-04-11 PROCEDURE — 87088 URINE BACTERIA CULTURE: CPT | Performed by: NURSE PRACTITIONER

## 2024-04-11 PROCEDURE — 99213 OFFICE O/P EST LOW 20 MIN: CPT | Performed by: NURSE PRACTITIONER

## 2024-04-11 PROCEDURE — 87186 SC STD MICRODIL/AGAR DIL: CPT | Performed by: NURSE PRACTITIONER

## 2024-04-11 RX ORDER — NITROFURANTOIN 25; 75 MG/1; MG/1
100 CAPSULE ORAL 2 TIMES DAILY
Qty: 10 CAPSULE | Refills: 0 | Status: SHIPPED | OUTPATIENT
Start: 2024-04-11 | End: 2024-04-16

## 2024-04-11 NOTE — ED PROVIDER NOTES
Patient Seen in: [facility]      History     Chief Complaint   Patient presents with    Urinary Symptoms     Stated Complaint: eval-g    Subjective:   58-year-old female with arthritis presents from home with concern for UTI.  States mild symptoms for about 4 days.  Complaining of frequency and some burning.  No hematuria.  Mild low back pain.  No abdominal pain.  No fever.  No vomiting.  Remedies attempted.  States about a month ago she had a physical with her primary doctor and was told that she had a UTI and she was asymptomatic at the time.  She did complete an unknown antibiotic from her primary    The history is provided by the patient. No  was used.       Objective:   No pertinent past medical history.        HISTORY:  Past Medical History:    Bulimia (HCC)    effexor    Peripheral neuropathy      Past Surgical History:   Procedure Laterality Date    Abdominoplasty  7/2003    Breast surgery  12/2003    Revision of breast implants    Breast surgery  7/2003    breast lift    Enlarge breast with implant  7/2003    Other surgical history  2014    bilateral thigh lift      Family History   Problem Relation Age of Onset    Skin cancer Father         melanoma    Gastro-Intestinal Disorder Father         colon polyps    Heart Disease Father         coronary artery disease    Melanoma Father     Neurological Disorder Sister         multiple sclerosis    Thyroid disease Brother     Cancer Maternal Grandfather         pancreatic    Diabetes Maternal Uncle     Heart Disease Paternal Grandmother         CAD    Heart Disease Paternal Uncle         CAD    Stroke Other         CVA, great aunt    Lipids Mother         hyperlipidemia    Gastro-Intestinal Disorder Mother         colon polyps    Basal Cell Mother       Social History     Socioeconomic History    Marital status:    Tobacco Use    Smoking status: Never    Smokeless tobacco: Never   Substance and Sexual Activity    Alcohol  use: Yes     Comment: rum, 1 drink, occasionally    Drug use: No    Sexual activity: Yes     Birth control/protection: OCP   Other Topics Concern    Caffeine Concern Yes     Comment: soda, chocolate, 6 cups daily    Pt has a pacemaker No    Pt has a defibrillator No    Reaction to local anesthetic No            No pertinent past surgical history.              No pertinent social history.            Review of Systems    Positive for stated complaint: eval-g  Other systems are as noted in HPI.  Constitutional and vital signs reviewed.      All other systems reviewed and negative except as noted above.    Physical Exam     ED Triage Vitals [04/11/24 1555]   /73   Pulse 68   Resp 18   Temp 97.9 °F (36.6 °C)   Temp src Temporal   SpO2 100 %   O2 Device None (Room air)       Current:/73   Pulse 68   Temp 97.9 °F (36.6 °C) (Temporal)   Resp 18   LMP 09/09/2020   SpO2 100%         Physical Exam  Vitals and nursing note reviewed.   Constitutional:       General: She is not in acute distress.     Appearance: Normal appearance. She is not ill-appearing or toxic-appearing.   HENT:      Head: Normocephalic and atraumatic.      Nose: Nose normal.      Mouth/Throat:      Mouth: Mucous membranes are moist.      Pharynx: Oropharynx is clear.   Eyes:      Pupils: Pupils are equal, round, and reactive to light.   Cardiovascular:      Rate and Rhythm: Normal rate and regular rhythm.      Pulses: Normal pulses.   Pulmonary:      Effort: Pulmonary effort is normal. No respiratory distress.      Breath sounds: Normal breath sounds.      Comments: Lungs clear.  No adventitious lung sounds.  No distress.  No hypoxia.  Pulse ox 100% ra. Which is normal    Abdominal:      General: Abdomen is flat.      Palpations: Abdomen is soft.      Tenderness: There is no abdominal tenderness. There is no right CVA tenderness or left CVA tenderness.   Musculoskeletal:         General: No signs of injury. Normal range of motion.       Cervical back: Normal range of motion and neck supple.   Skin:     General: Skin is warm and dry.      Capillary Refill: Capillary refill takes less than 2 seconds.   Neurological:      General: No focal deficit present.      Mental Status: She is alert and oriented to person, place, and time.      GCS: GCS eye subscore is 4. GCS verbal subscore is 5. GCS motor subscore is 6.   Psychiatric:         Mood and Affect: Mood normal.         Behavior: Behavior normal.         Thought Content: Thought content normal.         Judgment: Judgment normal.             ED Course     Labs Reviewed   [facility] POCT URINALYSIS DIPSTICK - Abnormal; Notable for the following components:       Result Value    Blood, Urine Trace-Intact (*)     Leukocyte esterase urine Small (*)     All other components within normal limits   URINE CULTURE, ROUTINE     Recent Results (from the past 24 hour(s))   POCT Urinalysis Dipstick    Collection Time: 04/11/24  3:55 PM   Result Value Ref Range    Urine Color Yellow Yellow    Urine Clarity Clear Clear    Specific Gravity, Urine 1.010 1.005 - 1.030    PH, Urine 7.0 5.0 - 8.0    Protein urine Negative Negative mg/dL    Glucose, Urine Negative Negative mg/dL    Ketone, Urine Negative Negative mg/dL    Bilirubin, Urine Negative Negative    Blood, Urine Trace-Intact (A) Negative    Nitrite Urine Negative Negative    Urobilinogen urine <2.0 <2.0 mg/dL    Leukocyte esterase urine Small (A) Negative       MDM        Medical Decision Making  Differential diagnosis: UTI, pyelonephritis, renal stone, vaginitis  UA showing mild infection with small leuks, trace blood.  No nitrates.  Urine culture was sent.  No previous urine culture on file to compare.  No fever.  No vomiting.  Nontoxic-appearing on exam.  No suspicion for pyelonephritis or renal stone  Shared decision making with patient -contaminated sample versus early infection  Patient would prefer to hold off on antibiotics pending the urine culture.  Macrobid  was sent to pharmacy, if symptoms increase she will start the antibiotic.  Recommend increasing water intake in the meantime.  Results and plan of care discussed with the patient/family. They are in agreement with discharge. They understand to follow up with their primary doctor or the referral physician for further evaluation, especially if no improvement.  Also discussed the limitations of immediate care, patient is aware that if symptoms are worse they should go to the emergency room. Verbal and written discharge instructions were given.       Problems Addressed:  Acute cystitis without hematuria: acute illness or injury    Amount and/or Complexity of Data Reviewed  External Data Reviewed: labs.     Details: No urine culture on file  Labs: ordered. Decision-making details documented in ED Course.    Risk  Prescription drug management.        Disposition and Plan     Clinical Impression:  1. Acute cystitis without hematuria         Disposition:  Discharge  4/11/2024  4:04 pm    Follow-up:  Yolanda Hernandes  [address] [facility] [address]  [phone]                Medications Prescribed:  Discharge Medication List as of 4/11/2024  4:06 PM        START taking these medications    Details   nitrofurantoin monohydrate macro 100 MG Oral Cap Take 1 capsule (100 mg total) by mouth 2 (two) times daily for 5 days., Normal, Disp-10 capsule, R-0

## 2024-04-30 ENCOUNTER — TELEPHONE (OUTPATIENT)
Dept: OBGYN CLINIC | Facility: CLINIC | Age: 59
End: 2024-04-30

## 2024-04-30 DIAGNOSIS — Z12.31 ENCOUNTER FOR SCREENING MAMMOGRAM FOR MALIGNANT NEOPLASM OF BREAST: Primary | ICD-10-CM

## 2024-04-30 NOTE — TELEPHONE ENCOUNTER
Pt has annual w/[facility] and Mammogram scheduled on 5/13. Pt self requested and is asking that order be placed.     To [facility] if ok to place order for screening Mammogram? Last one in 10/2022 was negative. Thank you.

## 2024-05-13 ENCOUNTER — OFFICE VISIT (OUTPATIENT)
Dept: OBGYN CLINIC | Facility: CLINIC | Age: 59
End: 2024-05-13
Payer: COMMERCIAL

## 2024-05-13 ENCOUNTER — HOSPITAL ENCOUNTER (OUTPATIENT)
Dept: MAMMOGRAPHY | Age: 59
Discharge: HOME | End: 2024-05-13
Attending: OBSTETRICS & GYNECOLOGY

## 2024-05-13 VITALS
WEIGHT: 169 LBS | SYSTOLIC BLOOD PRESSURE: 113 MMHG | DIASTOLIC BLOOD PRESSURE: 75 MMHG | BODY MASS INDEX: 22.89 KG/M2 | HEIGHT: 72 IN | HEART RATE: 64 BPM

## 2024-05-13 DIAGNOSIS — Z01.419 ENCOUNTER FOR GYNECOLOGICAL EXAMINATION WITHOUT ABNORMAL FINDING: Primary | ICD-10-CM

## 2024-05-13 DIAGNOSIS — Z12.31 ENCOUNTER FOR SCREENING MAMMOGRAM FOR MALIGNANT NEOPLASM OF BREAST: ICD-10-CM

## 2024-05-13 PROBLEM — N30.00 ACUTE CYSTITIS WITHOUT HEMATURIA: Status: RESOLVED | Noted: 2024-04-11 | Resolved: 2024-05-13

## 2024-05-13 PROCEDURE — 3074F SYST BP LT 130 MM HG: CPT | Performed by: OBSTETRICS & GYNECOLOGY

## 2024-05-13 PROCEDURE — 77063 BREAST TOMOSYNTHESIS BI: CPT | Performed by: OBSTETRICS & GYNECOLOGY

## 2024-05-13 PROCEDURE — 3078F DIAST BP <80 MM HG: CPT | Performed by: OBSTETRICS & GYNECOLOGY

## 2024-05-13 PROCEDURE — 77067 SCR MAMMO BI INCL CAD: CPT | Performed by: OBSTETRICS & GYNECOLOGY

## 2024-05-13 PROCEDURE — 3008F BODY MASS INDEX DOCD: CPT | Performed by: OBSTETRICS & GYNECOLOGY

## 2024-05-13 PROCEDURE — 99396 PREV VISIT EST AGE 40-64: CPT | Performed by: OBSTETRICS & GYNECOLOGY

## 2024-05-13 NOTE — PROGRESS NOTES
Eleni Guerrero is a 58 year old female  Patient's last menstrual period was 2020.   Chief Complaint   Patient presents with    Gyn Exam     ANNUAL EXAM -- retiring in 2 weeks. Moving to [place] but leaning towards returning here for MD appointments. No  bleed. Just had mammogram done. Only issue of concern is xs cold. No eval by PCP done. Has deliberately lost few pounds.    .  She has no complaints.  Denies postmenopausal bleeding, urinary or sexual issues.      OBSTETRICS HISTORY:     OB History    Para Term  AB Living   0 0 0 0 0 0   SAB IAB Ectopic Multiple Live Births   0 0 0 0         GYNE HISTORY:     Periods none due to menopause        Latest Ref Rng & Units 10/13/2022     4:59 PM 2019     2:12 PM   RECENT PAP RESULTS   Thinprep Pap Negative for intraepithelial lesion or malignancy Negative for intraepithelial lesion or malignancy  Negative for intraepithelial lesion or malignancy    HPV Negative Negative  Negative          MEDICAL HISTORY:     Past Medical History:    Bulimia (HCC)    effexor    Peripheral neuropathy     Past Surgical History:   Procedure Laterality Date    Abdominoplasty  2003    Breast surgery  2003    Revision of breast implants    Breast surgery  2003    breast lift    Enlarge breast with implant  2003    Other surgical history      bilateral thigh lift     OB History    Para Term  AB Living   0 0 0 0 0 0   SAB IAB Ectopic Multiple Live Births   0 0 0 0 0        SOCIAL HISTORY:     Social History     Socioeconomic History    Marital status:      Spouse name: Not on file    Number of children: Not on file    Years of education: Not on file    Highest education level: Not on file   Occupational History    Not on file   Tobacco Use    Smoking status: Never    Smokeless tobacco: Never   Substance and Sexual Activity    Alcohol use: Yes     Comment: rum, 1 drink, occasionally    Drug use: No    Sexual activity: Yes     Birth  control/protection: OCP   Other Topics Concern     Service Not Asked    Blood Transfusions Not Asked    Caffeine Concern Yes     Comment: soda, chocolate, 6 cups daily    Occupational Exposure Not Asked    Hobby Hazards Not Asked    Sleep Concern Not Asked    Stress Concern Not Asked    Weight Concern Not Asked    Special Diet Not Asked    Back Care Not Asked    Exercise Not Asked    Bike Helmet Not Asked    Seat Belt Not Asked    Self-Exams Not Asked    Grew up on a farm Not Asked    History of tanning Not Asked    Outdoor occupation Not Asked    Pt has a pacemaker No    Pt has a defibrillator No    Breast feeding Not Asked    Reaction to local anesthetic No   Social History Narrative    Not on file     Social Determinants of Health     Financial Resource Strain: Not on file   Food Insecurity: Not on file   Transportation Needs: Not on file   Physical Activity: Not on file   Stress: Not on file   Social Connections: Not on file   Housing Stability: Not on file       FAMILY HISTORY:     Family History   Problem Relation Age of Onset    Skin cancer Father         melanoma    Gastro-Intestinal Disorder Father         colon polyps    Heart Disease Father         coronary artery disease    Melanoma Father     Neurological Disorder Sister         multiple sclerosis    Thyroid disease Brother     Cancer Maternal Grandfather         pancreatic    Diabetes Maternal Uncle     Heart Disease Paternal Grandmother         CAD    Heart Disease Paternal Uncle         CAD    Stroke Other         CVA, great aunt    Lipids Mother         hyperlipidemia    Gastro-Intestinal Disorder Mother         colon polyps    Basal Cell Mother        MEDICATIONS:       Current Outpatient Medications:     omega-3-acid ethyl esters 1 g Oral Cap, Take 2 capsules (2 g total) by mouth 2 (two) times daily., Disp: 360 capsule, Rfl: 5    celecoxib 200 MG Oral Cap, Take 1 capsule (200 mg total) by mouth daily., Disp: 90 capsule, Rfl: 1     pantoprazole 40 MG Oral Tab EC, Take 1 tablet (40 mg total) by mouth before breakfast., Disp: , Rfl:     cyclobenzaprine 5 MG Oral Tab, Take 5-10mg at night as needed, Disp: 60 tablet, Rfl: 3    JUBLIA 10 % External Solution, APPLY TO TOENAILS 1-10 EVERY DAY (Patient not taking: Reported on 11/6/2023), Disp: , Rfl:     Meloxicam 10 MG Oral Cap, , Disp: , Rfl:     Multiple Vitamin (MULTIVITAMIN TAB/CAP), Take 1 capsule by mouth daily., Disp: , Rfl:     RESTASIS 0.05 % Ophthalmic Emulsion, , Disp: , Rfl: 3    ALLERGIES:       Allergies   Allergen Reactions    Erythromycin RASH         REVIEW OF SYSTEMS:     Constitutional:    denies fever / chills  Eyes:     denies blurred or double vision  Cardiovascular:  denies chest pain or palpitations  Respiratory:    denies shortness of breath  Gastrointestinal:  denies severe abdominal pain, frequent diarrhea or constipation, nausea / vomiting  Genitourinary:    denies dysuria, bothersome incontinence  Skin/Breast:   denies any breast pain, lumps, or discharge  Neurological:    denies frequent severe headaches  Psychiatric:   denies depression or anxiety, thoughts of harming self or others  Heme/Lymph:    denies easy bruising or bleeding    PHYSICAL EXAM:   Blood pressure 113/75, pulse 64, height 6' 2.5\" (1.892 m), weight 169 lb (76.7 kg), last menstrual period 09/09/2020, unknown if currently breastfeeding.  Constitutional:  well developed, well nourished  Head/Face:  normocephalic  Neck/Thyroid:  thyroid symmetric, no thyromegaly, no nodules, no adenopathy  Lymphatic: no abnormal supraclavicular or axillary adenopathy is noted  Breast:   normal without palpable masses, tenderness, asymmetry, nipple discharge, nipple retraction or skin changes (+) Implants  Respiratory:   nonlabored breathing  Cardiovascular: regular rate and rhythm  Abdomen:   soft, nontender, nondistended, no masses  Skin/Hair: no unusual rashes or bruises  Extremities:  no edema, no cyanosis  Psychiatric:    Oriented to time, place, person and situation. Appropriate mood and affect    Pelvic Exam:  External Genitalia:  normal appearance, hair distribution, and no lesions  Urethral Meatus:   normal in size, location, without lesions and prolapse  Bladder:    no fullness, masses or tenderness  Vagina:    normal appearance without lesions, no abnormal discharge  Cervix:    normal without tenderness on motion  Uterus:    normal in size, contour, position, mobility, without tenderness  Adnexa:   normal without masses or tenderness  Perineum:   normal  Anus:    no hemorroids    ASSESSMENT & PLAN:     Eleni was seen today for gyn exam.    Diagnoses and all orders for this visit:    Encounter for gynecological examination without abnormal finding          SUMMARY:    Pap: Next cotest 10/25-27 per ASCCP guidelines.    Mammogram: done recently    BCM: Vasectomy    STD screening: declines    Colon cancer screening: UTD    Misc: Calcium needs reviewed (1500 mg diet + supplement). Weight bearing exercise encouraged. Call if any VB (if perimenopausal, reviewed abn VB patterns)    HM updated    Depression screen:   Depression Screening (PHQ-2/PHQ-9): Over the LAST 2 WEEKS   Little interest or pleasure in doing things: Not at all    Feeling down, depressed, or hopeless: Not at all    PHQ-2 SCORE: 0          FOLLOW-UP     Return in about 1 year (around 5/13/2025) for annual gyne exam.    Note to patient and family:  The 21st Century Cures Act makes medical notes available to patients in the interest of transparency.  However, please be advised that this is a medical document.  It is intended as a peer to peer communication.  It is written in medical language and may contain abbreviations or verbiage that are technical and unfamiliar.  It may appear blunt or direct.  Medical documents are intended to carry relevant information, facts as evident, and the clinical opinion of the practitioner.

## 2024-06-05 NOTE — TELEPHONE ENCOUNTER
Requested Prescriptions     Pending Prescriptions Disp Refills    CELECOXIB 200 MG Oral Cap [Pharmacy Med Name: CELECOXIB CAP 200MG] 90 capsule 1     Sig: TAKE 1 CAPSULE DAILY     No future appointments.     LOV: 11/6/23  Last Refilled:11/20/23 #90 1RF     Summary:  1 .Check  labs and xrays   2. Physical therapy for neck and hips   3. Try cyclobenzaprine 5-10mg at night as needed for neck pain   4. Return to clinic in 2 months      Erik Loyola MD  11/6/2023  4:27 PM

## 2024-06-06 RX ORDER — CELECOXIB 200 MG/1
200 CAPSULE ORAL DAILY
Qty: 90 CAPSULE | Refills: 1 | Status: SHIPPED | OUTPATIENT
Start: 2024-06-06

## 2024-11-12 DIAGNOSIS — M25.50 POLYARTHRALGIA: Primary | ICD-10-CM

## 2024-11-12 NOTE — TELEPHONE ENCOUNTER
LOV: 11/14/23  No future appointments.    Summary:  1 .Check  labs and xrays   2. Physical therapy for neck and hips   3. Try cyclobenzaprine 5-10mg at night as needed for neck pain   4. Return to clinic in 2 months      Erik Loyola MD  11/6/2023  4:27 PM

## 2024-11-16 RX ORDER — CELECOXIB 200 MG/1
200 CAPSULE ORAL DAILY
Qty: 90 CAPSULE | Refills: 0 | Status: SHIPPED | OUTPATIENT
Start: 2024-11-16

## 2024-11-16 NOTE — TELEPHONE ENCOUNTER
Ask pt. To get labs this week - need liver and kidney tests every 6 months on celebrex - orders placed.   She will need appt in 3months - will send 3 month refill at this time til she gets in - but next time won't be able to fill until she gets her labs

## 2025-02-13 DIAGNOSIS — M25.50 POLYARTHRALGIA: ICD-10-CM

## 2025-02-13 RX ORDER — CELECOXIB 200 MG/1
200 CAPSULE ORAL DAILY
Qty: 90 CAPSULE | Refills: 0 | OUTPATIENT
Start: 2025-02-13

## (undated) NOTE — Clinical Note
January 18, 2017         Marcio Elena MD  [address] [id number]      Patient: Jeremy Reyes   YOB: 1965   Date of Visit: 1/16/2017       Dear Dr. Saniya Mo MD,    I am referring Hiram Rainey for evaluation and treatm

## (undated) NOTE — MR AVS SNAPSHOT
[facility]  [address]  [phone]               Thank you for choosing us for your health care visit with Terry Hook MD.  We are glad to serve you and happy to provide you with this summar Medical Issues Discussed Today     Visit for screening mammogram    -  Primary      Instructions and Information about Your Health     None      Allergies as of May 03, 2017     Erythromycin Unknown    Ethyl Alcohol  [Alcohol] Unknown    Penicillins Unknow

## (undated) NOTE — LETTER
[address], [facility]   Date:   1/16/2023     Name:   Nico Bryant    YOB: 1965   MRN:   [id number]       WHERE IS YOUR PAIN NOW? Gladys the areas on your body where you feel the described sensations. Use the appropriate symbol. Jasbir Bañuelos the areas of radiation. Include all affected areas. Just to complete the picture, please draw in the face. ACHE:  ^ ^ ^   NUMBNESS:  0000   PINS & NEEDLES:  = = = =                              ^ ^ ^                       0000              = = = =                                    ^ ^ ^                       0000            = = = =      BURNING:  XXXX   STABBING: ////                  XXXX                ////                         XXXX          ////     Please gladys the line below indicating your degree of pain right now  with 0 being no pain 10 being the worst pain possible.                                          0             1             2              3             4              5              6              7             8             9             10         Patient Signature:

## (undated) NOTE — LETTER
6/21/2017              Martha Mcdowell        [address] [address]         Dear Earle Chaves,    It was a pleasure to see you. Your Mammogram was normal, repeat in one year. I encourage you to do monthly self breast exams.  There is no

## (undated) NOTE — LETTER
AUTHORIZATION FOR SURGICAL OPERATION OR OTHER PROCEDURE    1.  I hereby authorize Dr. Krystyna Bedoya, and [facility] staff assigned to my case to perform the following operation and/or procedure at the [facility], [facility] ________________ Time:  ________ A. M.  P.M.        Patient Name:  ______________________________________________________  (please print)      Patient signature:  ___________________________________________________             Relationship to Patient: